# Patient Record
Sex: FEMALE | Race: BLACK OR AFRICAN AMERICAN | NOT HISPANIC OR LATINO | Employment: UNEMPLOYED | URBAN - METROPOLITAN AREA
[De-identification: names, ages, dates, MRNs, and addresses within clinical notes are randomized per-mention and may not be internally consistent; named-entity substitution may affect disease eponyms.]

---

## 2019-12-18 PROBLEM — F12.20 TETRAHYDROCANNABINOL (THC) USE DISORDER, SEVERE, DEPENDENCE: Status: ACTIVE | Noted: 2018-11-21

## 2019-12-18 PROBLEM — N76.6 GENITAL LABIAL ULCER: Status: ACTIVE | Noted: 2019-04-26

## 2019-12-18 PROBLEM — A59.01 TRICHOMONAS VAGINALIS (TV) INFECTION: Status: ACTIVE | Noted: 2019-04-26

## 2019-12-18 PROBLEM — Z72.51 HIGH RISK SEXUAL BEHAVIOR: Status: ACTIVE | Noted: 2019-04-26

## 2019-12-18 PROBLEM — B00.1 HERPES LABIALIS: Status: ACTIVE | Noted: 2019-04-27

## 2020-11-16 PROBLEM — Z87.59 HISTORY OF SEVERE PRE-ECLAMPSIA: Status: ACTIVE | Noted: 2019-03-29

## 2021-01-14 PROBLEM — O09.92 HIGH-RISK PREGNANCY IN SECOND TRIMESTER: Status: ACTIVE | Noted: 2021-01-14

## 2021-01-14 PROBLEM — N76.6 GENITAL LABIAL ULCER: Status: RESOLVED | Noted: 2019-04-26 | Resolved: 2021-01-14

## 2021-01-14 PROBLEM — Z72.51 HIGH RISK SEXUAL BEHAVIOR: Status: RESOLVED | Noted: 2019-04-26 | Resolved: 2021-01-14

## 2021-01-14 PROBLEM — F32.A DEPRESSION AFFECTING PREGNANCY: Status: ACTIVE | Noted: 2021-01-14

## 2021-01-14 PROBLEM — O23.40 UTI IN PREGNANCY: Status: ACTIVE | Noted: 2021-01-14

## 2021-01-14 PROBLEM — O99.340 DEPRESSION AFFECTING PREGNANCY: Status: ACTIVE | Noted: 2021-01-14

## 2021-02-25 PROBLEM — Z72.0 TOBACCO ABUSE: Status: ACTIVE | Noted: 2021-02-25

## 2021-02-25 PROBLEM — Z30.8 ENCOUNTER FOR TUBAL LIGATION COUNSELING: Status: ACTIVE | Noted: 2021-02-25

## 2021-04-03 PROBLEM — O26.899 PELVIC PRESSURE IN PREGNANCY: Status: ACTIVE | Noted: 2021-04-03

## 2021-04-03 PROBLEM — R10.2 PELVIC PRESSURE IN PREGNANCY: Status: ACTIVE | Noted: 2021-04-03

## 2021-04-12 PROBLEM — R45.851 SUICIDAL IDEATION: Status: ACTIVE | Noted: 2021-04-12

## 2021-04-12 PROBLEM — R51.9 PREGNANCY HEADACHE IN THIRD TRIMESTER: Status: ACTIVE | Noted: 2021-04-12

## 2021-04-12 PROBLEM — R10.2 PELVIC PRESSURE IN PREGNANCY: Status: RESOLVED | Noted: 2021-04-03 | Resolved: 2021-04-12

## 2021-04-12 PROBLEM — O26.893 PREGNANCY HEADACHE IN THIRD TRIMESTER: Status: ACTIVE | Noted: 2021-04-12

## 2021-04-12 PROBLEM — O26.899 PELVIC PRESSURE IN PREGNANCY: Status: RESOLVED | Noted: 2021-04-03 | Resolved: 2021-04-12

## 2021-04-12 PROBLEM — Z3A.34 34 WEEKS GESTATION OF PREGNANCY: Status: ACTIVE | Noted: 2021-04-12

## 2021-04-27 PROBLEM — R45.851 SUICIDAL IDEATION: Status: RESOLVED | Noted: 2021-04-12 | Resolved: 2021-04-27

## 2021-04-27 PROBLEM — Z3A.36 36 WEEKS GESTATION OF PREGNANCY: Status: ACTIVE | Noted: 2021-04-12

## 2021-05-02 PROBLEM — O99.820 GBS (GROUP B STREPTOCOCCUS CARRIER), +RV CULTURE, CURRENTLY PREGNANT: Status: ACTIVE | Noted: 2021-05-02

## 2021-05-03 PROBLEM — N93.9 VAGINAL SPOTTING: Status: ACTIVE | Noted: 2021-05-03

## 2021-05-03 PROBLEM — Z3A.37 37 WEEKS GESTATION OF PREGNANCY: Status: ACTIVE | Noted: 2021-04-12

## 2021-05-03 PROBLEM — R10.9 ABDOMINAL PAIN AFFECTING PREGNANCY: Status: ACTIVE | Noted: 2021-04-03

## 2021-05-10 PROBLEM — O09.93 HIGH-RISK PREGNANCY IN THIRD TRIMESTER: Status: ACTIVE | Noted: 2021-01-14

## 2021-05-10 PROBLEM — Z3A.38 38 WEEKS GESTATION OF PREGNANCY: Status: ACTIVE | Noted: 2021-04-12

## 2021-05-13 PROBLEM — O26.899 ABDOMINAL PAIN AFFECTING PREGNANCY: Status: RESOLVED | Noted: 2021-04-03 | Resolved: 2021-05-13

## 2021-05-13 PROBLEM — R10.9 ABDOMINAL PAIN AFFECTING PREGNANCY: Status: RESOLVED | Noted: 2021-04-03 | Resolved: 2021-05-13

## 2021-05-13 PROBLEM — N93.9 VAGINAL SPOTTING: Status: RESOLVED | Noted: 2021-05-03 | Resolved: 2021-05-13

## 2021-05-17 PROBLEM — O26.893 PREGNANCY HEADACHE IN THIRD TRIMESTER: Status: RESOLVED | Noted: 2021-04-12 | Resolved: 2021-05-17

## 2021-05-17 PROBLEM — R51.9 PREGNANCY HEADACHE IN THIRD TRIMESTER: Status: RESOLVED | Noted: 2021-04-12 | Resolved: 2021-05-17

## 2021-12-06 ENCOUNTER — PATIENT MESSAGE (OUTPATIENT)
Dept: RESEARCH | Facility: HOSPITAL | Age: 31
End: 2021-12-06

## 2022-04-18 PROBLEM — Z3A.38 38 WEEKS GESTATION OF PREGNANCY: Status: RESOLVED | Noted: 2021-04-12 | Resolved: 2022-04-18

## 2024-07-08 PROBLEM — K02.9 CARIES: Status: ACTIVE | Noted: 2024-07-08

## 2025-05-27 ENCOUNTER — PATIENT OUTREACH (OUTPATIENT)
Dept: ADMINISTRATIVE | Facility: HOSPITAL | Age: 35
End: 2025-05-27

## 2025-05-27 NOTE — PROGRESS NOTES
5-27-25 please address open care gap cervical cancer screening, please offer self swab during visit, noted on 5-28-25 appt notes